# Patient Record
Sex: FEMALE | Race: WHITE | URBAN - METROPOLITAN AREA
[De-identification: names, ages, dates, MRNs, and addresses within clinical notes are randomized per-mention and may not be internally consistent; named-entity substitution may affect disease eponyms.]

---

## 2024-06-04 ENCOUNTER — TELEPHONE (OUTPATIENT)
Age: 59
End: 2024-06-04

## 2024-06-04 NOTE — TELEPHONE ENCOUNTER
----- Message from Starr Reynolds sent at 6/4/2024  1:59 PM EDT -----  Let her know I reviewed her labs from 6/3/2024.  Her ESR, which is an inflammation marker, was only 1 point above normal.  We will watch this over time.  Her kidney and liver function look perfect.  Red blood cell count was a little low but hemoglobin was normal.  Recommend discuss this with PCP.     Gisselle Oshea MA  6/4/2024  2:45 PM EDT Back to Top      1st attempt to contact pt-lmom

## 2024-06-07 NOTE — TELEPHONE ENCOUNTER
2nd attempt    Starr Reynolds, APRN  6/4/2024  1:59 PM EDT       Let her know I reviewed her labs from 6/3/2024.  Her ESR, which is an inflammation marker, was only 1 point above normal.  We will watch this over time.  Her kidney and liver function look perfect.  Red blood cell count was a little low but hemoglobin was normal.  Recommend discuss this with PCP.       Called and left vm for patient to return call    OK TO RELAY

## 2024-06-10 ENCOUNTER — TELEPHONE (OUTPATIENT)
Age: 59
End: 2024-06-10

## 2024-06-10 RX ORDER — METHOTREXATE 2.5 MG/1
2.5 TABLET ORAL WEEKLY
COMMUNITY
End: 2024-06-10 | Stop reason: SDUPTHER

## 2024-06-10 RX ORDER — METHOTREXATE 2.5 MG/1
15 TABLET ORAL WEEKLY
Qty: 24 TABLET | Refills: 3 | Status: SHIPPED | OUTPATIENT
Start: 2024-06-10

## 2024-06-22 PROBLEM — M35.9 UNDIFFERENTIATED CONNECTIVE TISSUE DISEASE: Status: ACTIVE | Noted: 2024-06-22

## 2024-06-22 PROBLEM — M47.812 DEGENERATIVE ARTHRITIS OF CERVICAL SPINE: Status: ACTIVE | Noted: 2024-06-22

## 2024-06-22 PROBLEM — M85.80 OSTEOPENIA: Status: ACTIVE | Noted: 2024-06-22

## 2024-06-25 ENCOUNTER — OFFICE VISIT (OUTPATIENT)
Age: 59
End: 2024-06-25
Payer: MEDICARE

## 2024-06-25 VITALS
SYSTOLIC BLOOD PRESSURE: 132 MMHG | WEIGHT: 123.6 LBS | DIASTOLIC BLOOD PRESSURE: 80 MMHG | BODY MASS INDEX: 24.26 KG/M2 | HEART RATE: 64 BPM | TEMPERATURE: 98 F | HEIGHT: 60 IN

## 2024-06-25 DIAGNOSIS — D84.821 IMMUNOSUPPRESSION DUE TO DRUG THERAPY: ICD-10-CM

## 2024-06-25 DIAGNOSIS — Z79.899 IMMUNOSUPPRESSION DUE TO DRUG THERAPY: ICD-10-CM

## 2024-06-25 DIAGNOSIS — M35.00 SICCA, UNSPECIFIED TYPE: ICD-10-CM

## 2024-06-25 DIAGNOSIS — M79.7 FIBROMYALGIA: ICD-10-CM

## 2024-06-25 DIAGNOSIS — Z79.899 HIGH RISK MEDICATION USE: ICD-10-CM

## 2024-06-25 DIAGNOSIS — M25.562 CHRONIC PAIN OF BOTH KNEES: ICD-10-CM

## 2024-06-25 DIAGNOSIS — G89.29 CHRONIC PAIN OF BOTH KNEES: ICD-10-CM

## 2024-06-25 DIAGNOSIS — M47.812 OSTEOARTHRITIS OF CERVICAL SPINE, UNSPECIFIED SPINAL OSTEOARTHRITIS COMPLICATION STATUS: ICD-10-CM

## 2024-06-25 DIAGNOSIS — M35.9 UNDIFFERENTIATED CONNECTIVE TISSUE DISEASE: Primary | ICD-10-CM

## 2024-06-25 DIAGNOSIS — M25.561 CHRONIC PAIN OF BOTH KNEES: ICD-10-CM

## 2024-06-25 DIAGNOSIS — I73.00 RAYNAUD'S DISEASE WITHOUT GANGRENE: ICD-10-CM

## 2024-06-25 PROCEDURE — G2211 COMPLEX E/M VISIT ADD ON: HCPCS | Performed by: INTERNAL MEDICINE

## 2024-06-25 PROCEDURE — 99214 OFFICE O/P EST MOD 30 MIN: CPT | Performed by: INTERNAL MEDICINE

## 2024-06-25 RX ORDER — FOLIC ACID 1 MG/1
1 TABLET ORAL DAILY
Qty: 90 TABLET | Refills: 3 | Status: SHIPPED | OUTPATIENT
Start: 2024-06-25

## 2024-06-25 RX ORDER — ROSUVASTATIN CALCIUM 10 MG/1
10 TABLET, COATED ORAL DAILY
COMMUNITY
Start: 2024-04-01

## 2024-06-25 RX ORDER — TRAZODONE HYDROCHLORIDE 150 MG/1
150 TABLET ORAL NIGHTLY
COMMUNITY
Start: 2024-05-06 | End: 2024-06-25

## 2024-06-25 RX ORDER — METHOTREXATE 2.5 MG/1
15 TABLET ORAL WEEKLY
Qty: 24 TABLET | Refills: 3 | Status: SHIPPED | OUTPATIENT
Start: 2024-06-25

## 2024-06-25 RX ORDER — LINACLOTIDE 290 UG/1
290 CAPSULE, GELATIN COATED ORAL
COMMUNITY
Start: 2024-05-17

## 2024-06-25 RX ORDER — BUPROPION HYDROCHLORIDE 150 MG/1
150 TABLET ORAL DAILY
COMMUNITY
Start: 2024-05-06

## 2024-06-25 NOTE — ASSESSMENT & PLAN NOTE
- Continue amlodipine.  No digital ulcers.  She has quit smoking  - To help prevent the occurrence of Raynaud's phenomenon, preventative measures involve avoiding caffeine, not smoking, keeping the entire body warm, avoiding cold when possible and wearing gloves or mittens when exposed to colder temperatures

## 2024-06-25 NOTE — ASSESSMENT & PLAN NOTE
Prior surgery. No recent imaging.  [facility] - pain management.  Previously getting trigger point injections.    - Seeing pain management at [facility]  - PT in past helped, but she does not wish to go back

## 2024-06-25 NOTE — ASSESSMENT & PLAN NOTE
MTX  - Labs with CBC, CMP, ESR, CRP every 8-12 weeks  Well-tolerated and effective  - We discussed the possible side effects of methotrexate including, but not limited to: oral ulcers, nausea, diarrhea, rash, increased infection risk, bone marrow suppression, hepatitis, pulmonary toxicity.  We discussed the need to avoid alcohol and to have regulatory lab monitoring done while taking methotrexate. We discussed that live virus vaccines are contraindicated while taking methotrexate

## 2024-06-25 NOTE — ASSESSMENT & PLAN NOTE
Dx: 2009 - MARRY, - SSA/SSB (now +SSB), + malar rash (no biopsy, none today), arthralgias. - RF, CCP (2018).    Previous Rx: sulfasalazine (rash), Plaquenil (stopped 04/2021, retinal toxicity)  Current Rx: methotrexate 15 mg weekly, daily folic acid, meloxicam, pilocarpine, cyclobenzaprine, amlodipine    - Differential includes seronegative RA, undifferentiated connective tissue disease, crystalline arthropathy such as CPPD    Low disease activity.  No swollen or tender peripheral joints today  - Continue methotrexate 15 mg weekly with daily folic acid.  -Continue meloxicam  She does find them methotrexate beneficial for her arthritis.  Well-tolerated.  No side effects  -Labs reviewed 6/24 and are stable  - Labs with CBC, CMP, ESR, CRP every 8-12 weeks.  - RTC 3-4 months  - right 3rd trigger finger --status post injection with Dr. Leslie

## 2024-06-25 NOTE — PROGRESS NOTES
Office Follow Up      Date: 06/25/2024   Patient Name: Amira Perez  MRN: [id number]  YOB: 1965    Referring Physician: Provider, No Known     Chief Complaint:   Chief Complaint   Patient presents with    Undifferentiated Connective Tissue Disease    Fibromyalgia       History of Present Illness: Amira Perez is a 58 y.o. female with a past medical history of Raynaud's, fibromyalgia, COPD, DDD, sicca, anxiety, depression, eczema, hx of pleurisy, fatty liver, and basal cell carcinoma who presents today for follow up of her undifferentiated connective tissue disease and fibromyalgia.  She was initially diagnosed with undifferentiated connective tissue disease (-MARRY, -scl 70, negative SSA, positive SSB, reported malar rash, and polyarticular joint pain).  Former patient of Dr. Walsh    The patient stated she has been doing the same since her last visit.  She continues on methotrexate once weekly which she does think this helps her arthritis  She is currently taking amlodipine, cyclobenzaprine, pilocarpine, meloxicam, and omeprazole. She is getting gabapentin 300 mg QHS, amitriptyline, and trazodone from other providers.  She had to stop Plaquenil in 2021 due to retinal toxicity.  She reports triggering in her right 3rd finger.  Injection with Dr. Leslie helped briefly.  She had injection in her right knee with Dr. Leslie has helped  She also had right SI injection that has helped her.        Subjective       Review of Systems: Review of Systems   Constitutional:  Negative for chills, fatigue, fever and unexpected weight loss.   HENT:  Negative for mouth sores, sinus pressure and sore throat.         Dry mouth  Nose sores   Eyes:  Negative for pain and redness.        Dry eyes   Respiratory:  Negative for cough and shortness of breath.    Cardiovascular:  Negative for chest pain.   Gastrointestinal:  Negative for abdominal pain, blood in stool, diarrhea, nausea, vomiting and  GERD.   Endocrine: Negative for polydipsia and polyuria.   Genitourinary:  Negative for dysuria, genital sores and hematuria.   Musculoskeletal:  Positive for arthralgias and joint swelling. Negative for back pain, myalgias, neck pain and neck stiffness.   Skin:  Negative for rash and bruise.        Psoriasis  Photosensitvity  Malar rash   Allergic/Immunologic: Negative for immunocompromised state.   Neurological:  Negative for seizures, weakness, numbness and memory problem.   Hematological:  Negative for adenopathy. Does not bruise/bleed easily.   Psychiatric/Behavioral:  Negative for depressed mood. The patient is not nervous/anxious.         Medications:   Current Outpatient Medications:     albuterol sulfate  (90 Base) MCG/ACT inhaler, Inhale 2 puffs Every 4 (Four) Hours As Needed for Wheezing., Disp: , Rfl:     alendronate (Fosamax) 70 MG tablet, Take 1 tablet by mouth Every 7 (Seven) Days., Disp: , Rfl:     amitriptyline (ELAVIL) 25 MG tablet, Take 1 tablet by mouth Every Night., Disp: , Rfl:     amLODIPine (NORVASC) 5 MG tablet, Take 1 tablet by mouth Daily., Disp: , Rfl:     buPROPion XL (WELLBUTRIN XL) 150 MG 24 hr tablet, Take 1 tablet by mouth Daily., Disp: , Rfl:     cyclobenzaprine (FLEXERIL) 5 MG tablet, Take 1-2 tablets by mouth Every Night., Disp: , Rfl:     Diclofenac Sodium (VOLTAREN) 1 % gel gel, Apply 4 g topically to the appropriate area as directed 4 (Four) Times a Day As Needed., Disp: , Rfl:     folic acid (FOLVITE) 1 MG tablet, Take 1 tablet by mouth Daily., Disp: 90 tablet, Rfl: 3    gabapentin (NEURONTIN) 300 MG capsule, Take 1 capsule by mouth Every Night., Disp: , Rfl:     hydroCHLOROthiazide 25 MG tablet, Take 1 tablet by mouth Daily., Disp: , Rfl:     Linzess 290 MCG capsule capsule, Take 1 capsule by mouth Every Morning Before Breakfast., Disp: , Rfl:     meloxicam (MOBIC) 15 MG tablet, Take 1 tablet by mouth Daily., Disp: , Rfl:     methotrexate 2.5 MG tablet, Take 6 tablets  "by mouth 1 (One) Time Per Week., Disp: 24 tablet, Rfl: 3    metoprolol tartrate (LOPRESSOR) 25 MG tablet, Take 0.5 tablets by mouth Daily., Disp: , Rfl:     omeprazole (priLOSEC) 40 MG capsule, Take 1 capsule by mouth Daily., Disp: , Rfl:     pilocarpine (SALAGEN) 5 MG tablet, Take 1 tablet by mouth 4 (Four) Times a Day., Disp: , Rfl:     rosuvastatin (CRESTOR) 10 MG tablet, Take 1 tablet by mouth Daily., Disp: , Rfl:     vitamin D3 125 MCG (5000 UT) capsule capsule, Take 1 capsule by mouth Daily., Disp: , Rfl:     Allergies: No Known Allergies    I have reviewed and updated the patient's chief complaint, history of present illness, review of systems, past medical history, surgical history, family history, social history, medications and allergy list as appropriate.     Objective        Vital Signs:   Vitals:    06/25/24 1129   BP: 132/80   BP Location: Right arm   Patient Position: Sitting   Cuff Size: Adult   Pulse: 64   Temp: 98 °F (36.7 °C)   Weight: 56.1 kg (123 lb 9.6 oz)   Height: 152.4 cm (60\")   PainSc:   6     Body mass index is 24.14 kg/m².      Physical Exam:  Physical Exam   MUSCULOSKELETAL:   No peripheral synovitis.  No rheumatoid nodules or tophi  Right 3rd finger triggering.   Normal ROM in shoulders, elbows, and knees. No crepitus in bilateral knees.   Scattered tender points present above and below the waist    Complete joint exam was performed including the MCPs, PIPs, DIPs of the hands, wrists, elbows, shoulders, hips, knees and ankles.  No soft tissue swelling or tenderness is present except as above.    General: The patient is well-developed and well nourished. Cooperative, alert and oriented. Affect is normal. Hydration appears normal.   HEENT: Normocephalic and atraumatic. No notable alopecia. Lids and conjunctiva are normal. Pupils are equal and sclera are clear. Oropharynx is clear and dry  NECK neck is supple without adenopathy, masses or thyromegaly.   CARDIOVASCULAR: Regular rate and " "rhythm. No murmurs, rubs or gallops   LUNGS: Effort is normal. Lungs are clear bilateral   ABDOMEN: Not examined  EXTREMITIES: Peripheral pulses are intact. No clubbing.   SKIN: No rashes. No subcutaneous nodules. No digital ulcers. No sclerodactyly.   NEUROLOGIC: Gait is normal. Strength testing is normal.  No focal neurologic deficits    Results Review:   Labs:   No results found for: \"GLUCOSE\", \"BUN\", \"CREATININE\", \"EGFRRESULT\", \"EGFR\", \"BCR\", \"K\", \"CO2\", \"CALCIUM\", \"PROTENTOTREF\", \"ALBUMIN\", \"BILITOT\", \"AST\", \"ALT\"  No results found for: \"WBC\", \"HGB\", \"HCT\", \"MCV\", \"PLT\"  No results found for: \"SEDRATE\"  No results found for: \"CRP\"  No results found for: \"QUANTIFERO\", \"QUANTITB1\", \"QUANTITB2\", \"QUANTIFERN\", \"QUANTIFERM\", \"QUANTITBGLDP\"  No results found for: \"RF\"  No results found for: \"HEPBSAG\", \"HEPAIGM\", \"HEPBIGMCORE\", \"HEPCVIRUSABY\"      Procedures    Assessment / Plan        Assessment & Plan  Undifferentiated connective tissue disease  Dx: 2009 - MARRY, - SSA/SSB (now +SSB), + malar rash (no biopsy, none today), arthralgias. - RF, CCP (2018).    Previous Rx: sulfasalazine (rash), Plaquenil (stopped 04/2021, retinal toxicity)  Current Rx: methotrexate 15 mg weekly, daily folic acid, meloxicam, pilocarpine, cyclobenzaprine, amlodipine    - Differential includes seronegative RA, undifferentiated connective tissue disease, crystalline arthropathy such as CPPD    Low disease activity.  No swollen or tender peripheral joints today  - Continue methotrexate 15 mg weekly with daily folic acid.  -Continue meloxicam  She does find them methotrexate beneficial for her arthritis.  Well-tolerated.  No side effects  -Labs reviewed 6/24 and are stable  - Labs with CBC, CMP, ESR, CRP every 8-12 weeks.  - RTC 3-4 months  - right 3rd trigger finger --status post injection with Dr. Leslie      High risk medication use  MTX  - Labs with CBC, CMP, ESR, CRP every 8-12 weeks  Well-tolerated and effective  - We discussed the " possible side effects of methotrexate including, but not limited to: oral ulcers, nausea, diarrhea, rash, increased infection risk, bone marrow suppression, hepatitis, pulmonary toxicity.  We discussed the need to avoid alcohol and to have regulatory lab monitoring done while taking methotrexate. We discussed that live virus vaccines are contraindicated while taking methotrexate  Immunosuppression due to drug therapy    Raynaud's disease without gangrene  - Continue amlodipine.  No digital ulcers.  She has quit smoking  - To help prevent the occurrence of Raynaud's phenomenon, preventative measures involve avoiding caffeine, not smoking, keeping the entire body warm, avoiding cold when possible and wearing gloves or mittens when exposed to colder temperatures  Sicca, unspecified type  +SSB  -As needed pilocarpine  - For sicca symptoms, I recommended conservative measures including frequent lubrication, avoiding stimulants, and regular dental and ophthalmic exams.    Fibromyalgia  The symptoms are predominantly neuropathic. Her description of joints are most consistent with neuropathic symptoms/dysesthesias seen in a variety of neuropathic states including Fibromyalgia. Neuropathic medications form the basis of treatment for these conditions. Narcotics have no role in the treatment of neuropathic pain. I explained to her that neuropathic syndromes are generally long term syndromes that don't go away.    Current Rx: gabapentin 300 mg QHS (outside provider), trazodone (outside provider), amitriptyline (outside provider), cyclobenzaprine 5-10 mg QHS, meloxicam 15 mg daily.    - I have encouraged regular low impact aerobic exercise up to 30 minutes per day. If this is unattainable, recommend a graded exercise program starting with 5 minutes of dedicated cardiovascular exercise daily, increasing by 1 minute daily until goal of 30 minutes daily is reached.  - I have recommended conservative measures to improve sleep  -  Recommend avoiding narcotics studies have shown that narcotics can worsen fibromyalgia pain.   - Counseled on alternative therapies that can help with pain including massage therapy, aquatic therapy, physical therapy, acupuncture, chiropractics, and psychology evaluation.  - continue medications as above  Osteoarthritis of cervical spine, unspecified spinal osteoarthritis complication status  Prior surgery. No recent imaging.  [facility] - pain management.  Previously getting trigger point injections.    - Seeing pain management at [facility]  - PT in past helped, but she does not wish to go back  Chronic pain of both knees  - knee injection in interim mildly helpful  - Continue to follow with orthopedist Dr. Leslie    Orders Placed This Encounter   Procedures    CBC Auto Differential    Comprehensive Metabolic Panel    C-reactive Protein    Sedimentation Rate    Urinalysis With Culture If Indicated -     New Medications Ordered This Visit   Medications    folic acid (FOLVITE) 1 MG tablet     Sig: Take 1 tablet by mouth Daily.     Dispense:  90 tablet     Refill:  3    methotrexate 2.5 MG tablet     Sig: Take 6 tablets by mouth 1 (One) Time Per Week.     Dispense:  24 tablet     Refill:  3           Follow Up:   Return in about 3 months (around 9/25/2024).        Nico De Oliveira MD  [facility]

## 2024-06-25 NOTE — ASSESSMENT & PLAN NOTE
The symptoms are predominantly neuropathic. Her description of joints are most consistent with neuropathic symptoms/dysesthesias seen in a variety of neuropathic states including Fibromyalgia. Neuropathic medications form the basis of treatment for these conditions. Narcotics have no role in the treatment of neuropathic pain. I explained to her that neuropathic syndromes are generally long term syndromes that don't go away.    Current Rx: gabapentin 300 mg QHS (outside provider), trazodone (outside provider), amitriptyline (outside provider), cyclobenzaprine 5-10 mg QHS, meloxicam 15 mg daily.    - I have encouraged regular low impact aerobic exercise up to 30 minutes per day. If this is unattainable, recommend a graded exercise program starting with 5 minutes of dedicated cardiovascular exercise daily, increasing by 1 minute daily until goal of 30 minutes daily is reached.  - I have recommended conservative measures to improve sleep  - Recommend avoiding narcotics studies have shown that narcotics can worsen fibromyalgia pain.   - Counseled on alternative therapies that can help with pain including massage therapy, aquatic therapy, physical therapy, acupuncture, chiropractics, and psychology evaluation.  - continue medications as above

## 2024-06-25 NOTE — ASSESSMENT & PLAN NOTE
+SSB  -As needed pilocarpine  - For sicca symptoms, I recommended conservative measures including frequent lubrication, avoiding stimulants, and regular dental and ophthalmic exams.

## 2024-10-02 ENCOUNTER — TELEPHONE (OUTPATIENT)
Age: 59
End: 2024-10-02
Payer: MEDICARE

## 2024-10-02 DIAGNOSIS — M35.9 UNDIFFERENTIATED CONNECTIVE TISSUE DISEASE: ICD-10-CM

## 2024-10-02 RX ORDER — CYCLOBENZAPRINE HCL 5 MG
TABLET ORAL
Qty: 60 TABLET | Refills: 2 | OUTPATIENT
Start: 2024-10-02

## 2024-10-02 RX ORDER — AMLODIPINE BESYLATE 5 MG/1
5 TABLET ORAL DAILY
Qty: 30 TABLET | Refills: 0 | Status: SHIPPED | OUTPATIENT
Start: 2024-10-02

## 2024-10-02 RX ORDER — AMLODIPINE BESYLATE 5 MG/1
TABLET ORAL
Qty: 90 TABLET | Refills: 0 | OUTPATIENT
Start: 2024-10-02

## 2024-10-02 RX ORDER — METHOTREXATE 2.5 MG/1
15 TABLET ORAL WEEKLY
Qty: 24 TABLET | Refills: 0 | Status: SHIPPED | OUTPATIENT
Start: 2024-10-02

## 2024-10-02 RX ORDER — CYCLOBENZAPRINE HCL 5 MG
5-10 TABLET ORAL NIGHTLY
Qty: 60 TABLET | Refills: 0 | Status: SHIPPED | OUTPATIENT
Start: 2024-10-02

## 2024-10-02 NOTE — TELEPHONE ENCOUNTER
Attempted to call patient. No answer, left voicemail message.    RELAY: we received refills request for cyclobenzaprine 5 mg and amlodipine 5 mg. However, we have never seen you in our office.

## 2024-10-02 NOTE — TELEPHONE ENCOUNTER
Caller: Amira Perez    Relationship: Self    Best call back number: [phone]     Requested Prescriptions:   - methotrexate 2.5 MG tablet [54508] (Order [id number])     - cyclobenzaprine (FLEXERIL) 5 MG tablet [90706] (Order [id number])     - amLODIPine (NORVASC) 5 MG tablet [[id number]] (Order [id number])     Pharmacy where request should be sent: [address] [phone] [facility] [phone]      Last office visit with prescribing clinician: 6/25/2024   Next office visit with prescribing clinician: 10/11/2024     Additional details provided by patient: PATIENT ESTABLISHED WITH DR. MICHELLE FITZPATRICK AS OF 6/25/24. PHARMACY MAY HAVE SENT REFILL REQUEST TO WRONG OFFICE, UNSURE.     Does the patient have less than a 3 day supply:  [x] Yes  [] No    Would you like a call back once the refill request has been completed: [] Yes [x] No    If the office needs to give you a call back, can they leave a voicemail: [] Yes [x] No

## 2024-10-29 RX ORDER — AMLODIPINE BESYLATE 5 MG/1
5 TABLET ORAL DAILY
Qty: 30 TABLET | Refills: 0 | Status: SHIPPED | OUTPATIENT
Start: 2024-10-29

## 2024-10-29 RX ORDER — CYCLOBENZAPRINE HCL 5 MG
5-10 TABLET ORAL NIGHTLY
Qty: 60 TABLET | Refills: 0 | Status: SHIPPED | OUTPATIENT
Start: 2024-10-29

## 2024-10-29 RX ORDER — PILOCARPINE HYDROCHLORIDE 5 MG/1
5 TABLET, FILM COATED ORAL 4 TIMES DAILY PRN
Qty: 360 TABLET | Refills: 0 | Status: SHIPPED | OUTPATIENT
Start: 2024-10-29

## 2024-12-03 DIAGNOSIS — M35.9 UNDIFFERENTIATED CONNECTIVE TISSUE DISEASE: ICD-10-CM

## 2024-12-04 DIAGNOSIS — M35.9 UNDIFFERENTIATED CONNECTIVE TISSUE DISEASE: ICD-10-CM

## 2024-12-04 NOTE — TELEPHONE ENCOUNTER
Caller: Amira Perez    Relationship: Self    Best call back number: [phone]    Requested Prescriptions:   Requested Prescriptions     Pending Prescriptions Disp Refills    methotrexate 2.5 MG tablet 24 tablet 0     Sig: Take 6 tablets by mouth 1 (One) Time Per Week.    amLODIPine (NORVASC) 5 MG tablet 30 tablet 0     Sig: Take 1 tablet by mouth Daily. for blood pressure    cyclobenzaprine (FLEXERIL) 5 MG tablet 60 tablet 0     Sig: Take 1-2 tablets by mouth Every Night.        Pharmacy where request should be sent:  [place], [place]    Last office visit with prescribing clinician: 6/25/2024     Next office visit with prescribing clinician: 2/5/2025     Additional details provided by patient: PATIENT STATES SHE HAS ABOUT A WEEK LEFT ON THESE MEDICATIONS    Does the patient have less than a 3 day supply:  [] Yes  [x] No    Would you like a call back once the refill request has been completed: [x] Yes [] No    If the office needs to give you a call back, can they leave a voicemail: [x] Yes [] No

## 2024-12-09 RX ORDER — METHOTREXATE 2.5 MG/1
TABLET ORAL
Qty: 24 TABLET | Refills: 0 | Status: SHIPPED | OUTPATIENT
Start: 2024-12-09

## 2024-12-09 RX ORDER — METHOTREXATE 2.5 MG/1
15 TABLET ORAL WEEKLY
Qty: 24 TABLET | Refills: 0 | OUTPATIENT
Start: 2024-12-09

## 2024-12-09 RX ORDER — CYCLOBENZAPRINE HCL 5 MG
5-10 TABLET ORAL NIGHTLY
Qty: 60 TABLET | Refills: 0 | OUTPATIENT
Start: 2024-12-09

## 2024-12-09 RX ORDER — AMLODIPINE BESYLATE 5 MG/1
5 TABLET ORAL DAILY
Qty: 30 TABLET | Refills: 0 | Status: SHIPPED | OUTPATIENT
Start: 2024-12-09

## 2024-12-09 RX ORDER — AMLODIPINE BESYLATE 5 MG/1
5 TABLET ORAL DAILY
Qty: 30 TABLET | Refills: 0 | OUTPATIENT
Start: 2024-12-09

## 2024-12-09 RX ORDER — CYCLOBENZAPRINE HCL 5 MG
5-10 TABLET ORAL NIGHTLY
Qty: 60 TABLET | Refills: 0 | Status: SHIPPED | OUTPATIENT
Start: 2024-12-09

## 2025-02-12 ENCOUNTER — TELEPHONE (OUTPATIENT)
Age: 60
End: 2025-02-12
Payer: MEDICARE

## 2025-02-12 NOTE — TELEPHONE ENCOUNTER
Caller: Amira Perez    Relationship: Self    Best call back number: [phone]      What form or medical record are you requesting: RHEUMATOLOGY RECORDS    Who is requesting this form or medical record from you: [facility]    How would you like to receive the form or medical records (pick-up, mail, fax):   If fax, what is the fax number: [phone]      Timeframe paperwork needed: ASAP    Additional notes: PT NEEDS HER RECORDS FROM DR. FITZPATRICK TO [facility]

## 2025-02-14 NOTE — TELEPHONE ENCOUNTER
Called patient and let her know that we will mail a LENNIE form out for her to complete, once we receive it we can fax over the records she'd like.

## 2025-03-12 ENCOUNTER — RESULTS FOLLOW-UP (OUTPATIENT)
Age: 60
End: 2025-03-12
Payer: MEDICARE

## 2025-03-12 NOTE — LETTER
Amira Perez  [address] [place] [id number]    March 13, 2025     Dear Ms. Perez:    Below are the results from your recent labs:        The test results show that your labs show normal and stable findings   If you have any questions or concerns, please don't hesitate to call.         Sincerely,        Nico De Oliveira MD